# Patient Record
Sex: FEMALE | URBAN - METROPOLITAN AREA
[De-identification: names, ages, dates, MRNs, and addresses within clinical notes are randomized per-mention and may not be internally consistent; named-entity substitution may affect disease eponyms.]

---

## 2017-05-04 ENCOUNTER — APPOINTMENT (RX ONLY)
Dept: URBAN - METROPOLITAN AREA CLINIC 121 | Facility: CLINIC | Age: 77
Setting detail: DERMATOLOGY
End: 2017-05-04

## 2017-05-04 DIAGNOSIS — L72.0 EPIDERMAL CYST: ICD-10-CM

## 2017-05-04 DIAGNOSIS — L82.1 OTHER SEBORRHEIC KERATOSIS: ICD-10-CM

## 2017-05-04 DIAGNOSIS — L21.8 OTHER SEBORRHEIC DERMATITIS: ICD-10-CM

## 2017-05-04 PROBLEM — E78.5 HYPERLIPIDEMIA, UNSPECIFIED: Status: ACTIVE | Noted: 2017-05-04

## 2017-05-04 PROBLEM — I10 ESSENTIAL (PRIMARY) HYPERTENSION: Status: ACTIVE | Noted: 2017-05-04

## 2017-05-04 PROBLEM — E13.9 OTHER SPECIFIED DIABETES MELLITUS WITHOUT COMPLICATIONS: Status: ACTIVE | Noted: 2017-05-04

## 2017-05-04 PROBLEM — L85.3 XEROSIS CUTIS: Status: ACTIVE | Noted: 2017-05-04

## 2017-05-04 PROCEDURE — ? PRESCRIPTION

## 2017-05-04 PROCEDURE — ? BENIGN DESTRUCTION

## 2017-05-04 PROCEDURE — ? COUNSELING

## 2017-05-04 PROCEDURE — 99203 OFFICE O/P NEW LOW 30 MIN: CPT | Mod: 25

## 2017-05-04 PROCEDURE — 17110 DESTRUCTION B9 LES UP TO 14: CPT | Mod: NC

## 2017-05-04 RX ORDER — HYDROCORTISONE 25 MG/ML
LOTION TOPICAL
Qty: 1 | Refills: 1 | Status: ERX | COMMUNITY
Start: 2017-05-04

## 2017-05-04 RX ADMIN — HYDROCORTISONE: 25 LOTION TOPICAL at 20:14

## 2017-05-04 ASSESSMENT — LOCATION SIMPLE DESCRIPTION DERM
LOCATION SIMPLE: RIGHT FOREHEAD
LOCATION SIMPLE: LEFT CHEEK

## 2017-05-04 ASSESSMENT — LOCATION ZONE DERM: LOCATION ZONE: FACE

## 2017-05-04 ASSESSMENT — LOCATION DETAILED DESCRIPTION DERM
LOCATION DETAILED: LEFT MEDIAL MALAR CHEEK
LOCATION DETAILED: RIGHT INFERIOR FOREHEAD

## 2017-05-04 NOTE — PROCEDURE: BENIGN DESTRUCTION
Bill Insurance (You Assume Risk Of Denial Or Audit By Selecting Yes): Yes
Add 52 Modifier (Optional): no
Medical Necessity Information: It is in your best interest to select a reason for this procedure from the list below. All of these items fulfill various CMS LCD requirements except the new and changing color options.
Medical Necessity Clause: This procedure was medically necessary because the lesions that were treated were:
Detail Level: Detailed
Consent: The patient's consent was obtained including but not limited to risks of crusting, scabbing, blistering, scarring, darker or lighter pigmentary change, recurrence, incomplete removal and infection.
Anesthesia Volume In Cc: 0
Post-Care Instructions: I reviewed with the patient in detail post-care instructions. Patient is to wear sunprotection, and avoid picking at any of the treated lesions. Pt may apply Vaseline to crusted or scabbing areas.

## 2020-06-10 ENCOUNTER — OFFICE VISIT (OUTPATIENT)
Dept: URBAN - METROPOLITAN AREA CLINIC 63 | Facility: CLINIC | Age: 80
End: 2020-06-10

## 2020-06-10 ENCOUNTER — OFFICE VISIT (OUTPATIENT)
Dept: URBAN - METROPOLITAN AREA CLINIC 60 | Facility: CLINIC | Age: 80
End: 2020-06-10

## 2020-07-27 ENCOUNTER — OFFICE VISIT (OUTPATIENT)
Dept: URBAN - METROPOLITAN AREA CLINIC 63 | Facility: CLINIC | Age: 80
End: 2020-07-27

## 2020-10-29 ENCOUNTER — APPOINTMENT (RX ONLY)
Dept: URBAN - METROPOLITAN AREA CLINIC 148 | Facility: CLINIC | Age: 80
Setting detail: DERMATOLOGY
End: 2020-10-29

## 2020-10-29 VITALS — TEMPERATURE: 98.3 F

## 2020-10-29 DIAGNOSIS — L21.8 OTHER SEBORRHEIC DERMATITIS: ICD-10-CM

## 2020-10-29 PROCEDURE — ? COUNSELING

## 2020-10-29 PROCEDURE — ? ADDITIONAL NOTES

## 2020-10-29 PROCEDURE — 99202 OFFICE O/P NEW SF 15 MIN: CPT

## 2020-10-29 PROCEDURE — ? PRESCRIPTION

## 2020-10-29 RX ORDER — HYDROCORTISONE 25 MG/G
CREAM TOPICAL
Qty: 20 | Refills: 0 | Status: ERX | COMMUNITY
Start: 2020-10-29

## 2020-10-29 RX ADMIN — HYDROCORTISONE: 25 CREAM TOPICAL at 00:00

## 2020-10-29 ASSESSMENT — SEVERITY ASSESSMENT: HOW SEVERE IS THIS PATIENT'S CONDITION?: ALMOST CLEAR

## 2020-10-29 ASSESSMENT — LOCATION SIMPLE DESCRIPTION DERM
LOCATION SIMPLE: GLABELLA
LOCATION SIMPLE: LEFT CHEEK
LOCATION SIMPLE: RIGHT FOREHEAD
LOCATION SIMPLE: LEFT EYEBROW
LOCATION SIMPLE: RIGHT CHEEK

## 2020-10-29 ASSESSMENT — LOCATION DETAILED DESCRIPTION DERM
LOCATION DETAILED: RIGHT INFERIOR MEDIAL MALAR CHEEK
LOCATION DETAILED: RIGHT INFERIOR MEDIAL FOREHEAD
LOCATION DETAILED: GLABELLA
LOCATION DETAILED: LEFT MEDIAL EYEBROW
LOCATION DETAILED: LEFT INFERIOR MEDIAL MALAR CHEEK

## 2020-10-29 ASSESSMENT — LOCATION ZONE DERM: LOCATION ZONE: FACE

## 2020-10-29 NOTE — PROCEDURE: ADDITIONAL NOTES
Detail Level: Simple
Additional Notes: Patient consent was obtained to proceed with the visit and recommended plan of care after discussion of all risks and benefits, including the risks of COVID-19 exposure.
Detail Level: Generalized
Additional Notes: Pt to also do a daily moisturizer

## 2021-10-11 ENCOUNTER — OFFICE VISIT (OUTPATIENT)
Dept: URBAN - METROPOLITAN AREA CLINIC 63 | Facility: CLINIC | Age: 81
End: 2021-10-11

## 2021-10-28 ENCOUNTER — OFFICE VISIT (OUTPATIENT)
Dept: URBAN - METROPOLITAN AREA CLINIC 121 | Facility: CLINIC | Age: 81
End: 2021-10-28

## 2021-11-18 ENCOUNTER — OFFICE VISIT (OUTPATIENT)
Dept: URBAN - METROPOLITAN AREA CLINIC 60 | Facility: CLINIC | Age: 81
End: 2021-11-18

## 2021-12-28 ENCOUNTER — OFFICE VISIT (OUTPATIENT)
Dept: URBAN - METROPOLITAN AREA CLINIC 60 | Facility: CLINIC | Age: 81
End: 2021-12-28

## 2022-04-05 ENCOUNTER — OFFICE VISIT (OUTPATIENT)
Dept: URBAN - METROPOLITAN AREA CLINIC 60 | Facility: CLINIC | Age: 82
End: 2022-04-05

## 2022-04-13 ENCOUNTER — OFFICE VISIT (OUTPATIENT)
Dept: URBAN - METROPOLITAN AREA CLINIC 60 | Facility: CLINIC | Age: 82
End: 2022-04-13

## 2022-04-18 ENCOUNTER — OFFICE VISIT (OUTPATIENT)
Dept: URBAN - METROPOLITAN AREA MEDICAL CENTER 14 | Facility: MEDICAL CENTER | Age: 82
End: 2022-04-18

## 2022-05-06 ENCOUNTER — OFFICE VISIT (OUTPATIENT)
Dept: URBAN - METROPOLITAN AREA CLINIC 60 | Facility: CLINIC | Age: 82
End: 2022-05-06

## 2022-07-09 ENCOUNTER — TELEPHONE ENCOUNTER (OUTPATIENT)
Dept: URBAN - METROPOLITAN AREA CLINIC 121 | Facility: CLINIC | Age: 82
End: 2022-07-09

## 2022-07-09 RX ORDER — CLONIDINE HYDROCHLORIDE 0.1 MG/1
TABLET ORAL THREE TIMES A DAY
Refills: 0 | OUTPATIENT
Start: 2019-01-07 | End: 2019-07-08

## 2022-07-09 RX ORDER — ISOSORBIDE DINITRATE 30 MG/1
TABLET ORAL
Refills: 0 | OUTPATIENT
Start: 2019-07-08 | End: 2019-11-19

## 2022-07-09 RX ORDER — CARVEDILOL 3.12 MG/1
TABLET, FILM COATED ORAL
Refills: 0 | OUTPATIENT
Start: 2016-11-01 | End: 2017-02-06

## 2022-07-09 RX ORDER — DICYCLOMINE HYDROCHLORIDE 20 MG/2ML
TWICE A DAY INJECTION, SOLUTION INTRAMUSCULAR TWICE A DAY
Refills: 0 | OUTPATIENT
Start: 2015-05-28 | End: 2016-11-01

## 2022-07-09 RX ORDER — SUCRALFATE 1 G/1
TWICE A DAY TABLET ORAL TWICE A DAY
Refills: 0 | OUTPATIENT
Start: 2015-07-02 | End: 2016-11-01

## 2022-07-09 RX ORDER — LISINOPRIL 40 MG/1
TABLET ORAL
Refills: 0 | OUTPATIENT
Start: 2020-02-11 | End: 2020-07-27

## 2022-07-09 RX ORDER — AMLODIPINE BESYLATE 5 MG/1
TABLET ORAL
Refills: 0 | OUTPATIENT
Start: 2020-07-27 | End: 2021-10-11

## 2022-07-09 RX ORDER — SIMVASTATIN 40 MG/1
TABLET, FILM COATED ORAL
Refills: 0 | OUTPATIENT
Start: 2019-11-19 | End: 2020-02-11

## 2022-07-09 RX ORDER — ISOSORBIDE DINITRATE 30 MG/1
TABLET ORAL ONCE A DAY
Refills: 0 | OUTPATIENT
Start: 2021-12-28 | End: 2022-04-05

## 2022-07-09 RX ORDER — SUCRALFATE 1 G/1
TWICE A DAY TABLET ORAL TWICE A DAY
Refills: 0 | OUTPATIENT
Start: 2019-06-21 | End: 2019-07-08

## 2022-07-09 RX ORDER — AMLODIPINE BESYLATE 5 MG/1
TABLET ORAL
Refills: 0 | OUTPATIENT
Start: 2020-02-11 | End: 2020-07-27

## 2022-07-09 RX ORDER — CLONIDINE HYDROCHLORIDE 0.1 MG/1
TABLET ORAL THREE TIMES A DAY
Refills: 0 | OUTPATIENT
Start: 2021-10-11 | End: 2021-12-28

## 2022-07-09 RX ORDER — AMLODIPINE BESYLATE 5 MG/1
TABLET ORAL
Refills: 0 | OUTPATIENT
Start: 2019-07-08 | End: 2019-11-19

## 2022-07-09 RX ORDER — ESOMEPRAZOLE MAGNESIUM 40 MG/1
ONCE A DAY CAPSULE, DELAYED RELEASE ORAL ONCE A DAY
Refills: 3 | OUTPATIENT
Start: 2020-01-29 | End: 2020-02-11

## 2022-07-09 RX ORDER — PANTOPRAZOLE SODIUM 40 MG/1
ONCE A DAY TABLET, DELAYED RELEASE ORAL ONCE A DAY
Refills: 0 | OUTPATIENT
Start: 2022-02-02 | End: 2022-04-05

## 2022-07-09 RX ORDER — CLONIDINE HYDROCHLORIDE 0.1 MG/1
TABLET ORAL THREE TIMES A DAY
Refills: 0 | OUTPATIENT
Start: 2020-02-11 | End: 2020-07-27

## 2022-07-09 RX ORDER — ISOSORBIDE DINITRATE 30 MG/1
TABLET ORAL
Refills: 0 | OUTPATIENT
Start: 2019-01-07 | End: 2019-07-08

## 2022-07-09 RX ORDER — CLONIDINE HYDROCHLORIDE 0.1 MG/1
TABLET ORAL THREE TIMES A DAY
Refills: 0 | OUTPATIENT
Start: 2019-11-19 | End: 2020-02-11

## 2022-07-09 RX ORDER — SIMVASTATIN 40 MG/1
TABLET, FILM COATED ORAL
Refills: 0 | OUTPATIENT
Start: 2018-01-09 | End: 2019-01-07

## 2022-07-09 RX ORDER — ISOSORBIDE DINITRATE 30 MG/1
TABLET ORAL
Refills: 0 | OUTPATIENT
Start: 2018-01-09 | End: 2019-01-07

## 2022-07-09 RX ORDER — CARVEDILOL 3.12 MG/1
TABLET, FILM COATED ORAL
Refills: 0 | OUTPATIENT
Start: 2019-11-19 | End: 2020-02-11

## 2022-07-09 RX ORDER — OMEPRAZOLE 20 MG/1
ONCE A DAY CAPSULE, DELAYED RELEASE ORAL ONCE A DAY
Refills: 1 | OUTPATIENT
Start: 2015-07-02 | End: 2015-11-30

## 2022-07-09 RX ORDER — LISINOPRIL 40 MG/1
TABLET ORAL
Refills: 0 | OUTPATIENT
Start: 2018-01-09 | End: 2019-01-07

## 2022-07-09 RX ORDER — SACCHAROMYCES BOULARDII 250 MG
ONCE A DAY CAPSULE ORAL ONCE A DAY
Refills: 0 | OUTPATIENT
Start: 2015-04-15 | End: 2016-11-01

## 2022-07-09 RX ORDER — METFORMIN HCL 500 MG/1
TABLET ORAL
Refills: 0 | OUTPATIENT
Start: 2020-07-27 | End: 2021-10-11

## 2022-07-09 RX ORDER — OMEPRAZOLE 20 MG/1
TWICE A DAY CAPSULE, DELAYED RELEASE ORAL TWICE A DAY
Refills: 1 | OUTPATIENT
Start: 2019-05-24 | End: 2019-07-08

## 2022-07-09 RX ORDER — AMLODIPINE BESYLATE 5 MG/1
TABLET ORAL
Refills: 0 | OUTPATIENT
Start: 2019-11-19 | End: 2020-02-11

## 2022-07-09 RX ORDER — CARVEDILOL 3.12 MG/1
TABLET, FILM COATED ORAL
Refills: 0 | OUTPATIENT
Start: 2015-04-15 | End: 2016-11-01

## 2022-07-09 RX ORDER — AMLODIPINE BESYLATE 5 MG/1
TABLET ORAL
Refills: 0 | OUTPATIENT
Start: 2016-11-01 | End: 2017-02-06

## 2022-07-09 RX ORDER — CARVEDILOL 3.12 MG/1
TABLET, FILM COATED ORAL
Refills: 0 | OUTPATIENT
Start: 2020-07-27 | End: 2021-10-11

## 2022-07-09 RX ORDER — LISINOPRIL 40 MG/1
TABLET ORAL
Refills: 0 | OUTPATIENT
Start: 2019-01-07 | End: 2019-07-08

## 2022-07-09 RX ORDER — METFORMIN HCL 500 MG/1
TABLET ORAL
Refills: 0 | OUTPATIENT
Start: 2018-01-09 | End: 2019-01-07

## 2022-07-09 RX ORDER — METFORMIN HCL 500 MG/1
TABLET ORAL ONCE A DAY
Refills: 0 | OUTPATIENT
Start: 2021-10-11 | End: 2021-12-28

## 2022-07-09 RX ORDER — LISINOPRIL 40 MG/1
TABLET ORAL
Refills: 0 | OUTPATIENT
Start: 2016-11-01 | End: 2018-01-09

## 2022-07-09 RX ORDER — AMLODIPINE BESYLATE 5 MG/1
TABLET ORAL
Refills: 0 | OUTPATIENT
Start: 2017-02-06 | End: 2018-01-09

## 2022-07-09 RX ORDER — SUCRALFATE 1 G/1
TABLET ORAL TWICE A DAY
Refills: 0 | OUTPATIENT
Start: 2022-04-05 | End: 2022-04-05

## 2022-07-09 RX ORDER — METFORMIN HCL 500 MG/1
TABLET ORAL
Refills: 0 | OUTPATIENT
Start: 2020-02-11 | End: 2020-07-27

## 2022-07-09 RX ORDER — ASPIRIN 81 MG/1
TABLET, CHEWABLE ORAL
Refills: 0 | OUTPATIENT
Start: 2016-09-06 | End: 2016-11-01

## 2022-07-09 RX ORDER — LISINOPRIL 40 MG/1
TABLET ORAL
Refills: 0 | OUTPATIENT
Start: 2015-04-15 | End: 2016-11-01

## 2022-07-09 RX ORDER — SIMVASTATIN 40 MG/1
TABLET, FILM COATED ORAL
Refills: 0 | OUTPATIENT
Start: 2020-02-11 | End: 2020-07-27

## 2022-07-09 RX ORDER — CLONIDINE HYDROCHLORIDE 0.1 MG/1
TABLET ORAL THREE TIMES A DAY
Refills: 0 | OUTPATIENT
Start: 2019-07-08 | End: 2019-11-19

## 2022-07-09 RX ORDER — PANCRELIPASE 24000; 76000; 120000 [USP'U]/1; [USP'U]/1; [USP'U]/1
ONCE A DAY CAPSULE, DELAYED RELEASE PELLETS ORAL ONCE A DAY
Refills: 1 | OUTPATIENT
Start: 2019-11-05 | End: 2019-11-19

## 2022-07-09 RX ORDER — CARVEDILOL 3.12 MG/1
TABLET, FILM COATED ORAL
Refills: 0 | OUTPATIENT
Start: 2019-01-07 | End: 2019-07-08

## 2022-07-09 RX ORDER — LISINOPRIL 40 MG/1
TABLET ORAL
Refills: 0 | OUTPATIENT
Start: 2019-07-08 | End: 2019-11-19

## 2022-07-09 RX ORDER — PANTOPRAZOLE 20 MG/1
TWICE A DAY TABLET, DELAYED RELEASE ORAL TWICE A DAY
Refills: 0 | OUTPATIENT
Start: 2022-02-24 | End: 2022-04-05

## 2022-07-09 RX ORDER — LEVETIRACETAM 500 MG/1
TABLET, FILM COATED ORAL TWICE A DAY
Refills: 0 | OUTPATIENT
Start: 2018-06-28 | End: 2019-04-02

## 2022-07-09 RX ORDER — CARVEDILOL 3.12 MG/1
TABLET, FILM COATED ORAL
Refills: 0 | OUTPATIENT
Start: 2020-02-11 | End: 2020-07-27

## 2022-07-09 RX ORDER — METFORMIN HCL 500 MG/1
TABLET ORAL
Refills: 0 | OUTPATIENT
Start: 2017-02-06 | End: 2018-01-09

## 2022-07-09 RX ORDER — ESOMEPRAZOLE MAGNESIUM 40 MG/1
ONCE A DAY CAPSULE, DELAYED RELEASE ORAL ONCE A DAY
Refills: 2 | OUTPATIENT
Start: 2020-05-12 | End: 2020-07-27

## 2022-07-09 RX ORDER — DICYCLOMINE HYDROCHLORIDE 20 MG/2ML
TWICE A DAY INJECTION, SOLUTION INTRAMUSCULAR TWICE A DAY
Refills: 0 | OUTPATIENT
Start: 2018-03-06 | End: 2019-01-07

## 2022-07-09 RX ORDER — METFORMIN HCL 500 MG/1
TABLET ORAL
Refills: 0 | OUTPATIENT
Start: 2019-11-19 | End: 2020-02-11

## 2022-07-09 RX ORDER — SIMVASTATIN 40 MG/1
TABLET, FILM COATED ORAL
Refills: 0 | OUTPATIENT
Start: 2019-07-08 | End: 2019-11-19

## 2022-07-09 RX ORDER — ISOSORBIDE DINITRATE 30 MG/1
TABLET ORAL
Refills: 0 | OUTPATIENT
Start: 2016-11-01 | End: 2017-02-06

## 2022-07-09 RX ORDER — LISINOPRIL 40 MG/1
TABLET ORAL
Refills: 0 | OUTPATIENT
Start: 2020-07-27 | End: 2021-10-11

## 2022-07-09 RX ORDER — LISINOPRIL 40 MG/1
TABLET ORAL ONCE A DAY
Refills: 0 | OUTPATIENT
Start: 2021-10-11 | End: 2021-12-28

## 2022-07-09 RX ORDER — PANTOPRAZOLE SODIUM 40 MG/1
TAKE 1 TABLET BY MOUTH TWICE A DAY TABLET, DELAYED RELEASE ORAL
Refills: 1 | OUTPATIENT
Start: 2022-01-19 | End: 2022-02-02

## 2022-07-09 RX ORDER — ASPIRIN 81 MG/1
TABLET, CHEWABLE ORAL
Refills: 0 | OUTPATIENT
Start: 2018-01-09 | End: 2018-03-06

## 2022-07-09 RX ORDER — SIMVASTATIN 40 MG/1
TABLET, FILM COATED ORAL ONCE A DAY
Refills: 0 | OUTPATIENT
Start: 2021-10-11 | End: 2021-12-28

## 2022-07-09 RX ORDER — CARVEDILOL 3.12 MG/1
TABLET, FILM COATED ORAL
Refills: 0 | OUTPATIENT
Start: 2019-07-08 | End: 2019-11-19

## 2022-07-09 RX ORDER — FENUGREEK SEED/BL.THISTLE/ANIS 340 MG
CAPSULE ORAL
Refills: 0 | OUTPATIENT
Start: 2019-01-07 | End: 2019-04-02

## 2022-07-09 RX ORDER — CARVEDILOL 3.12 MG/1
TABLET, FILM COATED ORAL
Refills: 0 | OUTPATIENT
Start: 2018-01-09 | End: 2019-01-07

## 2022-07-09 RX ORDER — ESOMEPRAZOLE MAGNESIUM 40 MG/1
ONCE A DAY CAPSULE, DELAYED RELEASE ORAL ONCE A DAY
Refills: 2 | OUTPATIENT
Start: 2020-02-11 | End: 2020-02-11

## 2022-07-09 RX ORDER — ISOSORBIDE DINITRATE 30 MG/1
TABLET ORAL
Refills: 0 | OUTPATIENT
Start: 2019-11-19 | End: 2020-02-11

## 2022-07-09 RX ORDER — ISOSORBIDE DINITRATE 30 MG/1
TABLET ORAL
Refills: 0 | OUTPATIENT
Start: 2017-02-06 | End: 2018-01-09

## 2022-07-09 RX ORDER — CARVEDILOL 3.12 MG/1
TABLET, FILM COATED ORAL
Refills: 0 | OUTPATIENT
Start: 2017-02-06 | End: 2018-01-09

## 2022-07-09 RX ORDER — CLONIDINE HYDROCHLORIDE 0.1 MG/1
TABLET ORAL THREE TIMES A DAY
Refills: 0 | OUTPATIENT
Start: 2016-11-01 | End: 2017-02-06

## 2022-07-09 RX ORDER — ISOSORBIDE DINITRATE 30 MG/1
TABLET ORAL
Refills: 0 | OUTPATIENT
Start: 2015-04-15 | End: 2016-11-01

## 2022-07-09 RX ORDER — SIMVASTATIN 40 MG/1
TABLET, FILM COATED ORAL
Refills: 0 | OUTPATIENT
Start: 2017-02-06 | End: 2018-01-09

## 2022-07-09 RX ORDER — ISOSORBIDE DINITRATE 30 MG/1
TABLET ORAL
Refills: 0 | OUTPATIENT
Start: 2020-02-11 | End: 2020-07-27

## 2022-07-09 RX ORDER — CLONIDINE HYDROCHLORIDE 0.1 MG/1
TABLET ORAL THREE TIMES A DAY
Refills: 0 | OUTPATIENT
Start: 2017-02-06 | End: 2018-01-09

## 2022-07-09 RX ORDER — METFORMIN HCL 500 MG/1
TABLET ORAL
Refills: 0 | OUTPATIENT
Start: 2019-07-08 | End: 2019-11-19

## 2022-07-09 RX ORDER — METFORMIN HCL 500 MG/1
TABLET ORAL
Refills: 0 | OUTPATIENT
Start: 2015-04-15 | End: 2016-11-01

## 2022-07-09 RX ORDER — AMLODIPINE BESYLATE 5 MG/1
TABLET ORAL
Refills: 0 | OUTPATIENT
Start: 2019-01-07 | End: 2019-07-08

## 2022-07-09 RX ORDER — CLOPIDOGREL 75 MG/1
TABLET ORAL ONCE A DAY
Refills: 0 | OUTPATIENT
Start: 2021-10-11 | End: 2021-12-28

## 2022-07-09 RX ORDER — CLONIDINE HYDROCHLORIDE 0.1 MG/1
TABLET ORAL THREE TIMES A DAY
Refills: 0 | OUTPATIENT
Start: 2020-07-27 | End: 2021-10-11

## 2022-07-09 RX ORDER — CARVEDILOL 3.12 MG/1
TABLET, FILM COATED ORAL ONCE A DAY
Refills: 0 | OUTPATIENT
Start: 2021-10-11 | End: 2021-11-18

## 2022-07-09 RX ORDER — VITAM B12 100 MCG
TAB ORAL
Refills: 0 | OUTPATIENT
Start: 2019-01-07 | End: 2019-04-02

## 2022-07-09 RX ORDER — SIMVASTATIN 40 MG/1
TABLET, FILM COATED ORAL
Refills: 0 | OUTPATIENT
Start: 2020-07-27 | End: 2021-10-11

## 2022-07-09 RX ORDER — METFORMIN HCL 500 MG/1
TABLET ORAL
Refills: 0 | OUTPATIENT
Start: 2016-11-01 | End: 2017-02-06

## 2022-07-09 RX ORDER — CLONIDINE HYDROCHLORIDE 0.1 MG/1
TABLET ORAL THREE TIMES A DAY
Refills: 0 | OUTPATIENT
Start: 2016-09-06 | End: 2016-11-01

## 2022-07-09 RX ORDER — PANTOPRAZOLE SODIUM 20 MG
ONCE A DAY TABLET, DELAYED RELEASE (ENTERIC COATED) ORAL ONCE A DAY
Refills: 1 | OUTPATIENT
Start: 2021-07-01 | End: 2021-11-18

## 2022-07-09 RX ORDER — PANCRELIPASE 24000; 76000; 120000 [USP'U]/1; [USP'U]/1; [USP'U]/1
CAPSULE, DELAYED RELEASE PELLETS ORAL
Refills: 0 | OUTPATIENT
Start: 2019-11-19 | End: 2020-07-27

## 2022-07-09 RX ORDER — CLONIDINE HYDROCHLORIDE 0.2 MG/1
TABLET ORAL
Refills: 0 | OUTPATIENT
Start: 2015-04-15 | End: 2016-09-06

## 2022-07-09 RX ORDER — OMEPRAZOLE 20 MG/1
ONCE A DAY CAPSULE, DELAYED RELEASE ORAL ONCE A DAY
Refills: 1 | OUTPATIENT
Start: 2015-11-30 | End: 2019-05-24

## 2022-07-09 RX ORDER — SIMVASTATIN 40 MG/1
TABLET, FILM COATED ORAL
Refills: 0 | OUTPATIENT
Start: 2019-01-07 | End: 2019-07-08

## 2022-07-09 RX ORDER — ASPIRIN 81 MG/1
TABLET, CHEWABLE ORAL
Refills: 0 | OUTPATIENT
Start: 2017-02-06 | End: 2018-01-09

## 2022-07-09 RX ORDER — AMLODIPINE BESYLATE 5 MG/1
TABLET ORAL ONCE A DAY
Refills: 0 | OUTPATIENT
Start: 2021-10-11 | End: 2021-12-28

## 2022-07-09 RX ORDER — AMLODIPINE BESYLATE 5 MG/1
TABLET ORAL
Refills: 0 | OUTPATIENT
Start: 2015-04-15 | End: 2016-11-01

## 2022-07-09 RX ORDER — OMEPRAZOLE 40 MG/1
ONCE A DAY CAPSULE, DELAYED RELEASE ORAL ONCE A DAY
Refills: 3 | OUTPATIENT
Start: 2016-11-01 | End: 2017-02-06

## 2022-07-09 RX ORDER — CLONIDINE HYDROCHLORIDE 0.1 MG/1
TABLET ORAL THREE TIMES A DAY
Refills: 0 | OUTPATIENT
Start: 2021-12-28 | End: 2022-04-05

## 2022-07-09 RX ORDER — CLONIDINE HYDROCHLORIDE 0.1 MG/1
TABLET ORAL THREE TIMES A DAY
Refills: 0 | OUTPATIENT
Start: 2018-01-09 | End: 2019-01-07

## 2022-07-09 RX ORDER — SIMVASTATIN 40 MG/1
TABLET, FILM COATED ORAL
Refills: 0 | OUTPATIENT
Start: 2016-11-01 | End: 2017-02-06

## 2022-07-09 RX ORDER — ISOSORBIDE DINITRATE 30 MG/1
TABLET ORAL
Refills: 0 | OUTPATIENT
Start: 2020-07-27 | End: 2021-10-11

## 2022-07-09 RX ORDER — ASPIRIN 81 MG/1
TABLET, CHEWABLE ORAL
Refills: 0 | OUTPATIENT
Start: 2016-11-01 | End: 2017-02-06

## 2022-07-09 RX ORDER — SIMVASTATIN 40 MG/1
TABLET, FILM COATED ORAL
Refills: 0 | OUTPATIENT
Start: 2015-04-15 | End: 2016-11-01

## 2022-07-09 RX ORDER — LISINOPRIL 40 MG/1
TABLET ORAL
Refills: 0 | OUTPATIENT
Start: 2019-11-19 | End: 2020-02-11

## 2022-07-09 RX ORDER — ISOSORBIDE DINITRATE 30 MG/1
TABLET ORAL ONCE A DAY
Refills: 0 | OUTPATIENT
Start: 2021-10-11 | End: 2021-12-28

## 2022-07-09 RX ORDER — METFORMIN HCL 500 MG/1
TABLET ORAL
Refills: 0 | OUTPATIENT
Start: 2019-01-07 | End: 2019-07-08

## 2022-07-09 RX ORDER — SUCRALFATE 1 G/1
TWICE A DAY TABLET ORAL TWICE A DAY
Refills: 0 | OUTPATIENT
Start: 2019-05-24 | End: 2019-06-21

## 2022-07-09 RX ORDER — AMLODIPINE BESYLATE 5 MG/1
TABLET ORAL
Refills: 0 | OUTPATIENT
Start: 2018-01-09 | End: 2019-01-07

## 2022-07-09 RX ORDER — PANTOPRAZOLE SODIUM 40 MG/1
TWICE A DAY TABLET, DELAYED RELEASE ORAL TWICE A DAY
Refills: 1 | OUTPATIENT
Start: 2021-12-15 | End: 2022-01-19

## 2022-07-10 ENCOUNTER — TELEPHONE ENCOUNTER (OUTPATIENT)
Dept: URBAN - METROPOLITAN AREA CLINIC 121 | Facility: CLINIC | Age: 82
End: 2022-07-10

## 2022-07-10 RX ORDER — CHLORHEXIDINE GLUCONATE 4 %
LIQUID (ML) TOPICAL
Refills: 0 | Status: ACTIVE | COMMUNITY
Start: 2021-12-28

## 2022-07-10 RX ORDER — SIMVASTATIN 40 MG/1
TABLET, FILM COATED ORAL ONCE A DAY
Refills: 0 | Status: ACTIVE | COMMUNITY
Start: 2021-12-28

## 2022-07-10 RX ORDER — PANTOPRAZOLE SODIUM 40 MG/1
ONCE A DAY TABLET, DELAYED RELEASE ORAL ONCE A DAY
Refills: 0 | Status: ACTIVE | COMMUNITY
Start: 2022-05-03

## 2022-07-10 RX ORDER — CARVEDILOL 3.12 MG/1
TABLET, FILM COATED ORAL
Refills: 0 | Status: ACTIVE | COMMUNITY
Start: 2022-04-05

## 2022-07-10 RX ORDER — SUCRALFATE 1 G/1
ONCE A DAY.TAKE HALF TABLET BEFORE LUNCH AND AT BED TIME TABLET ORAL ONCE A DAY
Refills: 0 | Status: ACTIVE | COMMUNITY
Start: 2021-12-28

## 2022-07-10 RX ORDER — AMLODIPINE BESYLATE 5 MG/1
TABLET ORAL ONCE A DAY
Refills: 0 | Status: ACTIVE | COMMUNITY
Start: 2021-12-28

## 2022-07-10 RX ORDER — ESOMEPRAZOLE MAGNESIUM 40 MG
ONCE A DAY CAPSULE,DELAYED RELEASE (ENTERIC COATED) ORAL ONCE A DAY
Refills: 0 | Status: ACTIVE | COMMUNITY
Start: 2022-04-05

## 2022-07-10 RX ORDER — FAMOTIDINE 40 MG/1
ONCE A DAY TABLET, FILM COATED ORAL ONCE A DAY
Refills: 0 | Status: ACTIVE | COMMUNITY
Start: 2022-04-05

## 2022-07-10 RX ORDER — CLONIDINE HYDROCHLORIDE 0.1 MG/1
1/2 TAB BID TABLET ORAL
Refills: 0 | Status: ACTIVE | COMMUNITY
Start: 2022-04-05

## 2022-07-10 RX ORDER — ISOSORBIDE DINITRATE 30 MG/1
TABLET ORAL
Refills: 0 | Status: ACTIVE | COMMUNITY
Start: 2022-04-05

## 2022-07-10 RX ORDER — METFORMIN HCL 500 MG/1
TABLET ORAL ONCE A DAY
Refills: 0 | Status: ACTIVE | COMMUNITY
Start: 2021-12-28

## 2022-07-10 RX ORDER — LISINOPRIL 40 MG/1
TABLET ORAL ONCE A DAY
Refills: 0 | Status: ACTIVE | COMMUNITY
Start: 2021-12-28

## 2023-01-09 ENCOUNTER — DASHBOARD ENCOUNTERS (OUTPATIENT)
Age: 83
End: 2023-01-09

## 2023-01-12 ENCOUNTER — OFFICE VISIT (OUTPATIENT)
Dept: URBAN - METROPOLITAN AREA CLINIC 60 | Facility: CLINIC | Age: 83
End: 2023-01-12

## 2023-01-12 RX ORDER — CHLORHEXIDINE GLUCONATE 4 %
LIQUID (ML) TOPICAL
Refills: 0 | Status: ACTIVE | COMMUNITY
Start: 2021-12-28

## 2023-01-12 RX ORDER — ESOMEPRAZOLE MAGNESIUM 40 MG
ONCE A DAY CAPSULE,DELAYED RELEASE (ENTERIC COATED) ORAL ONCE A DAY
Refills: 0 | Status: ACTIVE | COMMUNITY
Start: 2022-04-05

## 2023-01-12 RX ORDER — CARVEDILOL 3.12 MG/1
TABLET, FILM COATED ORAL
Refills: 0 | Status: ACTIVE | COMMUNITY
Start: 2022-04-05

## 2023-01-12 RX ORDER — PANTOPRAZOLE SODIUM 40 MG/1
ONCE A DAY TABLET, DELAYED RELEASE ORAL ONCE A DAY
Refills: 0 | Status: ACTIVE | COMMUNITY
Start: 2022-05-03

## 2023-01-12 RX ORDER — METFORMIN HCL 500 MG/1
TABLET ORAL ONCE A DAY
Refills: 0 | Status: ACTIVE | COMMUNITY
Start: 2021-12-28

## 2023-01-12 RX ORDER — CLONIDINE HYDROCHLORIDE 0.1 MG/1
1/2 TAB BID TABLET ORAL
Refills: 0 | Status: ACTIVE | COMMUNITY
Start: 2022-04-05

## 2023-01-12 RX ORDER — AMLODIPINE BESYLATE 5 MG/1
TABLET ORAL ONCE A DAY
Refills: 0 | Status: ACTIVE | COMMUNITY
Start: 2021-12-28

## 2023-01-12 RX ORDER — FAMOTIDINE 40 MG/1
ONCE A DAY TABLET, FILM COATED ORAL ONCE A DAY
Refills: 0 | Status: ACTIVE | COMMUNITY
Start: 2022-04-05

## 2023-01-12 RX ORDER — SIMVASTATIN 40 MG/1
TABLET, FILM COATED ORAL ONCE A DAY
Refills: 0 | Status: ACTIVE | COMMUNITY
Start: 2021-12-28

## 2023-01-12 RX ORDER — ISOSORBIDE DINITRATE 30 MG/1
TABLET ORAL
Refills: 0 | Status: ACTIVE | COMMUNITY
Start: 2022-04-05

## 2023-01-12 RX ORDER — SUCRALFATE 1 G/1
ONCE A DAY.TAKE HALF TABLET BEFORE LUNCH AND AT BED TIME TABLET ORAL ONCE A DAY
Refills: 0 | Status: ACTIVE | COMMUNITY
Start: 2021-12-28

## 2023-01-12 RX ORDER — LISINOPRIL 40 MG/1
TABLET ORAL ONCE A DAY
Refills: 0 | Status: ACTIVE | COMMUNITY
Start: 2021-12-28

## 2023-01-16 ENCOUNTER — P2P PATIENT RECORD (OUTPATIENT)
Age: 83
End: 2023-01-16

## 2023-01-17 ENCOUNTER — TELEPHONE ENCOUNTER (OUTPATIENT)
Dept: URBAN - METROPOLITAN AREA CLINIC 63 | Facility: CLINIC | Age: 83
End: 2023-01-17